# Patient Record
Sex: FEMALE | Race: WHITE
[De-identification: names, ages, dates, MRNs, and addresses within clinical notes are randomized per-mention and may not be internally consistent; named-entity substitution may affect disease eponyms.]

---

## 2021-06-01 ENCOUNTER — HOSPITAL ENCOUNTER (OUTPATIENT)
Dept: HOSPITAL 53 - M WUC | Age: 26
End: 2021-06-01
Attending: FAMILY MEDICINE
Payer: COMMERCIAL

## 2021-06-01 DIAGNOSIS — E55.9: Primary | ICD-10-CM

## 2021-06-01 DIAGNOSIS — Z83.3: ICD-10-CM

## 2021-06-01 LAB
25(OH)D3 SERPL-MCNC: 27.2 NG/ML (ref 30–100)
ALBUMIN SERPL BCG-MCNC: 3.7 GM/DL (ref 3.2–5.2)
ALT SERPL W P-5'-P-CCNC: 20 U/L (ref 12–78)
APTT BLD: 26.5 SECONDS (ref 24.2–38.5)
BASOPHILS # BLD AUTO: 0.1 10^3/UL (ref 0–0.2)
BASOPHILS NFR BLD AUTO: 0.8 % (ref 0–1)
BILIRUB SERPL-MCNC: 0.2 MG/DL (ref 0.2–1)
BUN SERPL-MCNC: 17 MG/DL (ref 7–18)
CALCIUM SERPL-MCNC: 9.2 MG/DL (ref 8.5–10.1)
CHLORIDE SERPL-SCNC: 108 MEQ/L (ref 98–107)
CHOLEST SERPL-MCNC: 182 MG/DL (ref ?–200)
CHOLEST/HDLC SERPL: 2.89 {RATIO} (ref ?–5)
CO2 SERPL-SCNC: 24 MEQ/L (ref 21–32)
CREAT SERPL-MCNC: 0.67 MG/DL (ref 0.55–1.3)
CRP SERPL-MCNC: 0.43 MG/DL (ref 0–0.3)
EOSINOPHIL # BLD AUTO: 0.6 10^3/UL (ref 0–0.5)
EOSINOPHIL NFR BLD AUTO: 9.5 % (ref 0–3)
EST. AVERAGE GLUCOSE BLD GHB EST-MCNC: 103 MG/DL (ref 60–110)
FERRITIN SERPL-MCNC: 27 NG/ML (ref 8–252)
GFR SERPL CREATININE-BSD FRML MDRD: > 60 ML/MIN/{1.73_M2} (ref 60–?)
GLUCOSE SERPL-MCNC: 96 MG/DL (ref 70–100)
HCT VFR BLD AUTO: 46 % (ref 36–47)
HDLC SERPL-MCNC: 63 MG/DL (ref 40–?)
HGB BLD-MCNC: 14.8 G/DL (ref 12–15.5)
INR PPP: 0.92
LDLC SERPL CALC-MCNC: 105 MG/DL (ref ?–100)
LYMPHOCYTES # BLD AUTO: 2.6 10^3/UL (ref 1.5–5)
LYMPHOCYTES NFR BLD AUTO: 40.2 % (ref 24–44)
MCH RBC QN AUTO: 28.5 PG (ref 27–33)
MCHC RBC AUTO-ENTMCNC: 32.2 G/DL (ref 32–36.5)
MCV RBC AUTO: 88.5 FL (ref 80–96)
MONOCYTES # BLD AUTO: 0.8 10^3/UL (ref 0–0.8)
MONOCYTES NFR BLD AUTO: 11.8 % (ref 2–8)
NEUTROPHILS # BLD AUTO: 2.5 10^3/UL (ref 1.5–8.5)
NEUTROPHILS NFR BLD AUTO: 37.4 % (ref 36–66)
NONHDLC SERPL-MCNC: 119 MG/DL
PLATELET # BLD AUTO: 338 10^3/UL (ref 150–450)
POTASSIUM SERPL-SCNC: 4 MEQ/L (ref 3.5–5.1)
PROT SERPL-MCNC: 7.2 GM/DL (ref 6.4–8.2)
PROTHROMBIN TIME: 12.5 SECONDS (ref 12.5–14.3)
PTH-INTACT SERPL-MCNC: 54 PG/ML (ref 18.5–88)
RBC # BLD AUTO: 5.2 10^6/UL (ref 4–5.4)
SODIUM SERPL-SCNC: 141 MEQ/L (ref 136–145)
T4 FREE SERPL-MCNC: 0.91 NG/DL (ref 0.76–1.46)
TRIGL SERPL-MCNC: 68 MG/DL (ref ?–150)
TSH SERPL DL<=0.005 MIU/L-ACNC: 1.81 UIU/ML (ref 0.36–3.74)
WBC # BLD AUTO: 6.6 10^3/UL (ref 4–10)

## 2021-06-23 ENCOUNTER — HOSPITAL ENCOUNTER (OUTPATIENT)
Dept: HOSPITAL 53 - M RAD | Age: 26
End: 2021-06-23
Attending: FAMILY MEDICINE
Payer: COMMERCIAL

## 2021-06-23 DIAGNOSIS — Z15.01: Primary | ICD-10-CM

## 2021-06-23 DIAGNOSIS — N60.01: ICD-10-CM

## 2021-06-23 NOTE — REP
INDICATION:

SUSCEPTIBILITY TO MALIGNANT NEOPLASM OF BREAST.



COMPARISON:

No comparison breast imaging.



TECHNIQUE:

[facility] MRI imaging was performed with a dedicated breast coil.  Axial, coronal,

and sagittal T1 and T2 weighted scans were obtained with and without fat saturation in

the usual fashion.  The study includes dynamically acquired post gadolinium-enhanced

imaging with image subtraction.  Maximum intensity projection and multi planar

reformation imaging is included as well.  This study is interpreted with the aid of

[employer], an FDA approved computer aided detection (CAD) software program, on a

dedicated breast MRI workstation.  The gadolinium enhancement dose is 15 mL of

intravenous ProHance.



FINDINGS:



There is a moderate to marked amount of fibroglandular tissue bilaterally

corresponding with the mammographic pattern.  There is mild background parenchymal

enhancement.  There is no evidence of axillary lymphadenopathy or significant breast

cystic change. High-resolution pre and post-contrast T1 and T2 weighted scans show no

suspicious morphologic abnormality in either breast. Dynamically acquired sequential

postcontrast images show no suspicious area of enhancement and washout kinetics in

either breast to suggest malignancy. Subtraction images show no additional abnormality.



There is a small subcentimeter cyst on the right.



IMPRESSION:

BI-RADS category 2 benign bilateral breast MRI findings.





<Electronically signed by Jesse Goldstein > 06/23/21 [id number]

## 2022-01-19 ENCOUNTER — HOSPITAL ENCOUNTER (OUTPATIENT)
Dept: HOSPITAL 53 - M SFHCWAGY | Age: 27
End: 2022-01-19
Attending: ADVANCED PRACTICE MIDWIFE
Payer: COMMERCIAL

## 2022-01-19 DIAGNOSIS — Z12.4: Primary | ICD-10-CM

## 2022-10-21 ENCOUNTER — HOSPITAL ENCOUNTER (OUTPATIENT)
Dept: HOSPITAL 53 - M WUC | Age: 27
End: 2022-10-21
Attending: FAMILY MEDICINE
Payer: COMMERCIAL

## 2022-10-21 DIAGNOSIS — Z15.01: ICD-10-CM

## 2022-10-21 DIAGNOSIS — R73.01: Primary | ICD-10-CM

## 2022-10-21 LAB
ALBUMIN SERPL BCG-MCNC: 3.6 GM/DL (ref 3.2–5.2)
ALT SERPL W P-5'-P-CCNC: 20 U/L (ref 12–78)
BILIRUB SERPL-MCNC: 0.2 MG/DL (ref 0.2–1)
BUN SERPL-MCNC: 20 MG/DL (ref 7–18)
CALCIUM SERPL-MCNC: 8.6 MG/DL (ref 8.5–10.1)
CANCER AG125 SERPL-ACNC: 11.8 U/ML (ref ?–30.2)
CHLORIDE SERPL-SCNC: 109 MEQ/L (ref 98–107)
CO2 SERPL-SCNC: 24 MEQ/L (ref 21–32)
CREAT SERPL-MCNC: 0.7 MG/DL (ref 0.55–1.3)
CREAT UR-MCNC: 146 MG/DL
EST. AVERAGE GLUCOSE BLD GHB EST-MCNC: 108 MG/DL (ref 60–110)
GFR SERPL CREATININE-BSD FRML MDRD: > 60 ML/MIN/{1.73_M2} (ref 60–?)
GLUCOSE SERPL-MCNC: 111 MG/DL (ref 70–100)
MICROALBUMIN UR-MCNC: 13.1 MG/L
MICROALBUMIN/CREAT UR: 8.9 MCG/MG (ref 0–30)
POTASSIUM SERPL-SCNC: 4.5 MEQ/L (ref 3.5–5.1)
PROT SERPL-MCNC: 6.9 GM/DL (ref 6.4–8.2)
SODIUM SERPL-SCNC: 140 MEQ/L (ref 136–145)
THYROGLOB AB SERPL-ACNC: < 15 U/ML (ref ?–60)
THYROPEROXIDASE AB SERPL IA-ACNC: < 28 U/ML (ref ?–60)

## 2022-10-26 ENCOUNTER — HOSPITAL ENCOUNTER (OUTPATIENT)
Dept: HOSPITAL 53 - M WHC | Age: 27
End: 2022-10-26
Attending: FAMILY MEDICINE
Payer: COMMERCIAL

## 2022-10-26 DIAGNOSIS — Z15.01: Primary | ICD-10-CM

## 2025-01-07 ENCOUNTER — HOSPITAL ENCOUNTER (OUTPATIENT)
Dept: HOSPITAL 53 - M WHC | Age: 30
End: 2025-01-07
Attending: FAMILY MEDICINE
Payer: COMMERCIAL

## 2025-01-07 DIAGNOSIS — Z15.01: Primary | ICD-10-CM

## 2025-02-13 ENCOUNTER — HOSPITAL ENCOUNTER (OUTPATIENT)
Dept: HOSPITAL 53 - M SFHCWAGY | Age: 30
End: 2025-02-13
Payer: COMMERCIAL

## 2025-02-13 DIAGNOSIS — Z12.4: Primary | ICD-10-CM

## 2025-02-13 DIAGNOSIS — Z11.51: ICD-10-CM

## 2025-04-21 ENCOUNTER — HOSPITAL ENCOUNTER (OUTPATIENT)
Dept: HOSPITAL 53 - M WUC | Age: 30
End: 2025-04-21
Attending: FAMILY MEDICINE
Payer: COMMERCIAL

## 2025-04-21 DIAGNOSIS — D50.9: ICD-10-CM

## 2025-04-21 DIAGNOSIS — R73.01: Primary | ICD-10-CM

## 2025-04-21 DIAGNOSIS — E55.9: ICD-10-CM

## 2025-04-21 LAB
ALBUMIN SERPL BCG-MCNC: 3.9 G/DL (ref 3.2–5.2)
BASOPHILS NFR BLD AUTO: 0.6 % (ref 0–1)
BUN SERPL-MCNC: 16 MG/DL (ref 9–23)
CALCIUM SERPL-MCNC: 8.6 MG/DL (ref 8.5–10.1)
CHLORIDE SERPL-SCNC: 107 MMOL/L (ref 98–107)
CHOLEST SERPL-MCNC: 159 MG/DL (ref ?–200)
CHOLEST/HDLC SERPL: 2.65 {RATIO} (ref ?–5)
CO2 SERPL-SCNC: 27 MMOL/L (ref 20–31)
CREAT SERPL-MCNC: 0.67 MG/DL (ref 0.55–1.3)
EOSINOPHIL # BLD AUTO: 0.6 10^3/UL (ref 0–0.5)
EOSINOPHIL NFR BLD AUTO: 7.9 % (ref 0–3)
GFR SERPL CREATININE-BSD FRML MDRD: > 90 ML/MIN/{1.73_M2} (ref 60–?)
GLUCOSE SERPL-MCNC: 102 MG/DL (ref 60–100)
HCT VFR BLD AUTO: 42.5 % (ref 36–47)
HDLC SERPL-MCNC: 59.9 MG/DL (ref 40–?)
HGB BLD-MCNC: 13.8 G/DL (ref 12–15.5)
LDLC SERPL CALC-MCNC: 74.1 MG/DL (ref ?–100)
LYMPHOCYTES # BLD AUTO: 2.5 10^3/UL (ref 1.5–5)
LYMPHOCYTES NFR BLD AUTO: 35.4 % (ref 24–44)
MCH RBC QN AUTO: 28.9 PG (ref 27–33)
MCHC RBC AUTO-ENTMCNC: 32.5 G/DL (ref 32–36.5)
MCV RBC AUTO: 89.1 FL (ref 80–96)
MONOCYTES # BLD AUTO: 0.5 10^3/UL (ref 0–0.8)
MONOCYTES NFR BLD AUTO: 6.5 % (ref 2–8)
NEUTROPHILS # BLD AUTO: 3.5 10^3/UL (ref 1.5–8.5)
NEUTROPHILS NFR BLD AUTO: 49.5 % (ref 36–66)
NONHDLC SERPL-MCNC: 99.1 MG/DL
PHOSPHATE SERPL-MCNC: 3.5 MG/DL (ref 2.5–4.9)
PLATELET # BLD AUTO: 337 10^3/UL (ref 150–450)
POTASSIUM SERPL-SCNC: 3.8 MMOL/L (ref 3.5–5.1)
PTH-INTACT SERPL-MCNC: 70.5 PG/ML (ref 18.5–88)
RBC # BLD AUTO: 4.77 10^6/UL (ref 4–5.4)
SODIUM SERPL-SCNC: 140 MMOL/L (ref 136–145)
TRIGL SERPL-MCNC: 125 MG/DL (ref ?–150)
WBC # BLD AUTO: 7.1 10^3/UL (ref 4–10)